# Patient Record
Sex: MALE | Race: BLACK OR AFRICAN AMERICAN | Employment: UNEMPLOYED | URBAN - METROPOLITAN AREA
[De-identification: names, ages, dates, MRNs, and addresses within clinical notes are randomized per-mention and may not be internally consistent; named-entity substitution may affect disease eponyms.]

---

## 2023-04-19 ENCOUNTER — OFFICE VISIT (OUTPATIENT)
Age: 28
End: 2023-04-19
Payer: COMMERCIAL

## 2023-04-19 ENCOUNTER — HOSPITAL ENCOUNTER (OUTPATIENT)
Facility: HOSPITAL | Age: 28
Setting detail: SPECIMEN
Discharge: HOME | End: 2023-04-22
Payer: COMMERCIAL

## 2023-04-19 VITALS
RESPIRATION RATE: 16 BRPM | HEIGHT: 69 IN | OXYGEN SATURATION: 100 % | HEART RATE: 64 BPM | WEIGHT: 133.6 LBS | TEMPERATURE: 98.4 F | DIASTOLIC BLOOD PRESSURE: 75 MMHG | SYSTOLIC BLOOD PRESSURE: 123 MMHG | BODY MASS INDEX: 19.79 KG/M2

## 2023-04-19 DIAGNOSIS — M10.9 ACUTE GOUT OF MULTIPLE SITES, UNSPECIFIED CAUSE: Primary | ICD-10-CM

## 2023-04-19 DIAGNOSIS — Z87.442 HISTORY OF NEPHROLITHIASIS: ICD-10-CM

## 2023-04-19 DIAGNOSIS — M10.9 ACUTE GOUT OF MULTIPLE SITES, UNSPECIFIED CAUSE: ICD-10-CM

## 2023-04-19 DIAGNOSIS — Z87.39 HISTORY OF GOUT: ICD-10-CM

## 2023-04-19 LAB
ALBUMIN SERPL-MCNC: 3.7 G/DL (ref 3.4–5)
ALBUMIN/GLOB SERPL: 1 (ref 0.8–1.7)
ALP SERPL-CCNC: 122 U/L (ref 45–117)
ALT SERPL-CCNC: 19 U/L (ref 16–61)
ANION GAP SERPL CALC-SCNC: 2 MMOL/L (ref 3–18)
AST SERPL-CCNC: 12 U/L (ref 10–38)
BASOPHILS # BLD: 0 K/UL (ref 0–0.1)
BASOPHILS NFR BLD: 0 % (ref 0–2)
BILIRUB SERPL-MCNC: 0.2 MG/DL (ref 0.2–1)
BUN SERPL-MCNC: 24 MG/DL (ref 7–18)
BUN/CREAT SERPL: 20 (ref 12–20)
CALCIUM SERPL-MCNC: 9.5 MG/DL (ref 8.5–10.1)
CHLORIDE SERPL-SCNC: 112 MMOL/L (ref 100–111)
CO2 SERPL-SCNC: 26 MMOL/L (ref 21–32)
CREAT SERPL-MCNC: 1.19 MG/DL (ref 0.6–1.3)
DIFFERENTIAL METHOD BLD: ABNORMAL
EOSINOPHIL # BLD: 0 K/UL (ref 0–0.4)
EOSINOPHIL NFR BLD: 0 % (ref 0–5)
ERYTHROCYTE [DISTWIDTH] IN BLOOD BY AUTOMATED COUNT: 21.1 % (ref 11.6–14.5)
GLOBULIN SER CALC-MCNC: 3.6 G/DL (ref 2–4)
GLUCOSE SERPL-MCNC: 83 MG/DL (ref 74–99)
HCT VFR BLD AUTO: 33.6 % (ref 36–48)
HGB BLD-MCNC: 10.4 G/DL (ref 13–16)
IMM GRANULOCYTES # BLD AUTO: 0 K/UL (ref 0–0.04)
IMM GRANULOCYTES NFR BLD AUTO: 0 % (ref 0–0.5)
LYMPHOCYTES # BLD: 1.9 K/UL (ref 0.9–3.6)
LYMPHOCYTES NFR BLD: 26 % (ref 21–52)
MCH RBC QN AUTO: 30.7 PG (ref 24–34)
MCHC RBC AUTO-ENTMCNC: 31 G/DL (ref 31–37)
MCV RBC AUTO: 99.1 FL (ref 78–100)
MONOCYTES # BLD: 0.8 K/UL (ref 0.05–1.2)
MONOCYTES NFR BLD: 12 % (ref 3–10)
NEUTS SEG # BLD: 4.5 K/UL (ref 1.8–8)
NEUTS SEG NFR BLD: 61 % (ref 40–73)
NRBC # BLD: 0.02 K/UL (ref 0–0.01)
NRBC BLD-RTO: 0.3 PER 100 WBC
PLATELET # BLD AUTO: 391 K/UL (ref 135–420)
PMV BLD AUTO: 11.4 FL (ref 9.2–11.8)
POTASSIUM SERPL-SCNC: 4.3 MMOL/L (ref 3.5–5.5)
PROT SERPL-MCNC: 7.3 G/DL (ref 6.4–8.2)
RBC # BLD AUTO: 3.39 M/UL (ref 4.35–5.65)
SODIUM SERPL-SCNC: 140 MMOL/L (ref 136–145)
URATE SERPL-MCNC: 9.4 MG/DL (ref 2.6–7.2)
WBC # BLD AUTO: 7.3 K/UL (ref 4.6–13.2)

## 2023-04-19 PROCEDURE — 84550 ASSAY OF BLOOD/URIC ACID: CPT

## 2023-04-19 PROCEDURE — 36415 COLL VENOUS BLD VENIPUNCTURE: CPT

## 2023-04-19 PROCEDURE — 80053 COMPREHEN METABOLIC PANEL: CPT

## 2023-04-19 PROCEDURE — 99203 OFFICE O/P NEW LOW 30 MIN: CPT | Performed by: NURSE PRACTITIONER

## 2023-04-19 PROCEDURE — 85025 COMPLETE CBC W/AUTO DIFF WBC: CPT

## 2023-04-19 RX ORDER — PREDNISONE 20 MG/1
20 TABLET ORAL DAILY
Qty: 5 TABLET | Refills: 0 | Status: SHIPPED | OUTPATIENT
Start: 2023-04-19 | End: 2023-04-24

## 2023-04-19 SDOH — ECONOMIC STABILITY: FOOD INSECURITY: WITHIN THE PAST 12 MONTHS, THE FOOD YOU BOUGHT JUST DIDN'T LAST AND YOU DIDN'T HAVE MONEY TO GET MORE.: NEVER TRUE

## 2023-04-19 SDOH — ECONOMIC STABILITY: FOOD INSECURITY: WITHIN THE PAST 12 MONTHS, YOU WORRIED THAT YOUR FOOD WOULD RUN OUT BEFORE YOU GOT MONEY TO BUY MORE.: NEVER TRUE

## 2023-04-19 SDOH — ECONOMIC STABILITY: HOUSING INSECURITY
IN THE LAST 12 MONTHS, WAS THERE A TIME WHEN YOU DID NOT HAVE A STEADY PLACE TO SLEEP OR SLEPT IN A SHELTER (INCLUDING NOW)?: NO

## 2023-04-19 SDOH — ECONOMIC STABILITY: INCOME INSECURITY: HOW HARD IS IT FOR YOU TO PAY FOR THE VERY BASICS LIKE FOOD, HOUSING, MEDICAL CARE, AND HEATING?: SOMEWHAT HARD

## 2023-04-19 ASSESSMENT — PATIENT HEALTH QUESTIONNAIRE - PHQ9
SUM OF ALL RESPONSES TO PHQ QUESTIONS 1-9: 0
1. LITTLE INTEREST OR PLEASURE IN DOING THINGS: 0
SUM OF ALL RESPONSES TO PHQ9 QUESTIONS 1 & 2: 0
2. FEELING DOWN, DEPRESSED OR HOPELESS: 0
SUM OF ALL RESPONSES TO PHQ QUESTIONS 1-9: 0

## 2023-04-19 ASSESSMENT — ENCOUNTER SYMPTOMS: COLOR CHANGE: 0

## 2023-04-19 NOTE — PROGRESS NOTES
1. \"Have you been to the ER, urgent care clinic since your last visit? Hospitalized since your last visit? \" No    2. \"Have you seen or consulted any other health care providers outside of the [address] [place] [address] since your last visit? \" No     3. For patients aged 39-70: Has the patient had a colonoscopy / FIT/ Cologuard?  NA - based on age
ankle with swelling medially and laterally, gait is guarded, DP 2 +   Skin:     General: Skin is warm and dry. Capillary Refill: Capillary refill takes less than 2 seconds. Findings: No erythema. An electronic signature was used to authenticate this note.     --BRITT Mosquera - NP

## 2023-04-26 ENCOUNTER — TELEPHONE (OUTPATIENT)
Age: 28
End: 2023-04-26

## 2023-04-26 NOTE — TELEPHONE ENCOUNTER
Informed patient NP Freedom Louis has not reviewed his lab results yet and I will reach out to him once she does. Patient verbalized a understanding.      Sathya London

## 2023-04-27 DIAGNOSIS — R74.8 ELEVATED ALKALINE PHOSPHATASE LEVEL: ICD-10-CM

## 2023-04-27 DIAGNOSIS — D50.9 IRON DEFICIENCY ANEMIA, UNSPECIFIED IRON DEFICIENCY ANEMIA TYPE: Primary | ICD-10-CM

## 2023-04-27 DIAGNOSIS — M1A.9XX1 TOPHI: ICD-10-CM

## 2023-04-27 DIAGNOSIS — D50.9 IRON DEFICIENCY ANEMIA, UNSPECIFIED IRON DEFICIENCY ANEMIA TYPE: ICD-10-CM

## 2023-04-27 DIAGNOSIS — Z87.442 HISTORY OF NEPHROLITHIASIS: ICD-10-CM

## 2023-04-27 DIAGNOSIS — Z87.39 HISTORY OF GOUT: Primary | ICD-10-CM

## 2023-04-27 RX ORDER — LANOLIN ALCOHOL/MO/W.PET/CERES
325 CREAM (GRAM) TOPICAL 2 TIMES DAILY
Qty: 90 TABLET | Refills: 3 | Status: SHIPPED | OUTPATIENT
Start: 2023-04-27

## 2023-04-27 NOTE — PROGRESS NOTES
Ref to rheum for long term gout and nephrolithiasis in childhood into adulthood.  He is with anemia, will start iron and repeat testing in 3 months or sooner

## 2023-05-08 ENCOUNTER — TELEPHONE (OUTPATIENT)
Age: 28
End: 2023-05-08

## 2023-05-08 NOTE — TELEPHONE ENCOUNTER
Tried reaching out to patient in regards to his lab results. Patient mom state that she will have him return my call once she returns home.      Andrzej Irizarry

## 2023-05-10 ENCOUNTER — TELEPHONE (OUTPATIENT)
Age: 28
End: 2023-05-10

## 2023-05-12 NOTE — TELEPHONE ENCOUNTER
Spoke w patient regarding labs. States he has gout flair up and no lead exposure. Patient verbalized a understanding for labs.      Elisabet Bush

## 2023-12-06 PROBLEM — N17.9 ACUTE KIDNEY INJURY (HCC): Status: ACTIVE | Noted: 2023-12-06

## 2023-12-11 PROBLEM — Q61.5 MEDULLARY SPONGE KIDNEY OF BOTH KIDNEYS: Status: ACTIVE | Noted: 2023-12-11

## 2023-12-11 PROBLEM — Z96.0 S/P URETERAL STENT PLACEMENT: Status: ACTIVE | Noted: 2023-12-11

## 2023-12-11 PROBLEM — D64.9 CHRONIC ANEMIA: Status: ACTIVE | Noted: 2023-12-11

## 2023-12-11 PROBLEM — M10.9 GOUT: Status: ACTIVE | Noted: 2023-12-11

## 2023-12-11 PROBLEM — N17.9 ACUTE KIDNEY INJURY (HCC): Status: RESOLVED | Noted: 2023-12-06 | Resolved: 2023-12-11

## 2023-12-11 PROBLEM — E53.8 B12 DEFICIENCY: Status: ACTIVE | Noted: 2023-12-11

## 2023-12-11 PROBLEM — N13.30 HYDRONEPHROSIS: Status: ACTIVE | Noted: 2023-12-11

## 2023-12-13 PROBLEM — A41.9 SEPTIC SHOCK (HCC): Status: ACTIVE | Noted: 2023-12-13

## 2023-12-13 PROBLEM — R65.21 SEPTIC SHOCK (HCC): Status: ACTIVE | Noted: 2023-12-13

## 2023-12-25 DIAGNOSIS — R93.89 ABNORMAL X-RAY: Primary | ICD-10-CM

## 2023-12-25 DIAGNOSIS — Z87.39 HISTORY OF GOUT: ICD-10-CM

## 2023-12-29 ENCOUNTER — TELEPHONE (OUTPATIENT)
Age: 28
End: 2023-12-29

## 2023-12-29 NOTE — TELEPHONE ENCOUNTER
Patient returning call from nurse in regards to results from blood work.
Tried reaching out to patient, but there was no answer.  Left .     Kirstie Perfect
PROVIDER:[TOKEN:[[id number]]]

## 2023-12-29 NOTE — TELEPHONE ENCOUNTER
----- Message from BRITT Masterson NP sent at 12/25/2023  8:00 PM EST -----  I would like you to be seen by the foot/ankle specialist for abnormality to the xray, sent in referral to Dr. Rocío Naidu.

## 2024-01-04 ENCOUNTER — TELEPHONE (OUTPATIENT)
Age: 29
End: 2024-01-04

## 2024-01-04 NOTE — TELEPHONE ENCOUNTER
Reached out to patient again to go over the xray results, but his brother answered the call. Patient brother will informed him to give me a call.

## 2024-01-04 NOTE — TELEPHONE ENCOUNTER
----- Message from BRITT El NP sent at 12/25/2023  8:00 PM EST -----  I would like you to be seen by the foot/ankle specialist for abnormality to the xray, sent in referral to Dr. Yost.

## 2024-01-22 ENCOUNTER — TELEPHONE (OUTPATIENT)
Age: 29
End: 2024-01-22

## 2024-02-02 ENCOUNTER — OFFICE VISIT (OUTPATIENT)
Age: 29
End: 2024-02-02
Payer: COMMERCIAL

## 2024-02-02 VITALS
HEIGHT: 67 IN | OXYGEN SATURATION: 98 % | DIASTOLIC BLOOD PRESSURE: 76 MMHG | BODY MASS INDEX: 20.56 KG/M2 | HEART RATE: 60 BPM | SYSTOLIC BLOOD PRESSURE: 127 MMHG | WEIGHT: 131 LBS | RESPIRATION RATE: 16 BRPM

## 2024-02-02 DIAGNOSIS — M10.9 ACUTE GOUT OF RIGHT KNEE, UNSPECIFIED CAUSE: Primary | Chronic | ICD-10-CM

## 2024-02-02 PROBLEM — A41.9 SEPTIC SHOCK (HCC): Status: RESOLVED | Noted: 2023-12-13 | Resolved: 2024-02-02

## 2024-02-02 PROBLEM — R65.21 SEPTIC SHOCK (HCC): Status: RESOLVED | Noted: 2023-12-13 | Resolved: 2024-02-02

## 2024-02-02 PROBLEM — Z96.0 S/P URETERAL STENT PLACEMENT: Status: RESOLVED | Noted: 2023-12-11 | Resolved: 2024-02-02

## 2024-02-02 PROCEDURE — 99213 OFFICE O/P EST LOW 20 MIN: CPT | Performed by: NURSE PRACTITIONER

## 2024-02-02 ASSESSMENT — PATIENT HEALTH QUESTIONNAIRE - PHQ9
2. FEELING DOWN, DEPRESSED OR HOPELESS: 0
1. LITTLE INTEREST OR PLEASURE IN DOING THINGS: 0
SUM OF ALL RESPONSES TO PHQ QUESTIONS 1-9: 0
SUM OF ALL RESPONSES TO PHQ9 QUESTIONS 1 & 2: 0

## 2024-02-02 NOTE — PROGRESS NOTES
Levi Levy (:  1995) is a 28 y.o. male, here for evaluation of the following medical concerns:  Chief Complaint   Patient presents with    Follow-up     Pt c/o Pain in Right knee         ASSESSMENT/PLAN:    1. Acute gout of right knee, unspecified cause   Acute on chronic gout flare- he has been out of medication x 1 week will refill, no redness, swelling, heat and pain is mild     Return if symptoms worsen or fail to improve.        HPI  Right knee pain x 2 days states gout flare, no swelling, redness, pain /10. No otc medications. Ran out of allopurinol x 1 week.     2023 gfr >60 and Creatinine 1.35,  electrolytes good, he is with kidney or urology specialist- medullary sponge kidney bilateral, has urethral stent in place  He reports under Urology care    Dragging left leg- Abnormal xray left ankle and refer to ortho 2023 has not seen    Review of Systems   Constitutional:  Negative for diaphoresis and fatigue.   Respiratory:  Negative for cough, chest tightness and shortness of breath.    Cardiovascular:  Negative for chest pain, palpitations and leg swelling.   Gastrointestinal:  Negative for abdominal pain, nausea and vomiting.   Neurological:  Negative for dizziness, weakness and headaches.       Prior to Visit Medications    Medication Sig Taking? Authorizing Provider   allopurinol (ZYLOPRIM) 100 MG tablet Take 1 tablet by mouth daily Yes Jessica Kellogg APRN - NP   vitamin B-12 1000 MCG tablet Take 1 tablet by mouth daily Yes Ollie Keys MD        Social History     Tobacco Use    Smoking status: Some Days     Current packs/day: 0.50     Types: Cigarettes     Passive exposure: Never    Smokeless tobacco: Never   Substance Use Topics    Alcohol use: Never        /76 (Site: Left Upper Arm, Position: Sitting, Cuff Size: Medium Adult)   Pulse 60   Resp 16   Ht 1.702 m (5' 7\")   Wt 59.4 kg (131 lb)   SpO2 98%   BMI 20.52 kg/m²   Estimated body mass index is 20.52

## 2024-02-04 RX ORDER — ALLOPURINOL 100 MG/1
100 TABLET ORAL DAILY
Qty: 90 TABLET | Refills: 1 | Status: SHIPPED | OUTPATIENT
Start: 2024-02-04

## 2024-05-28 ENCOUNTER — HOSPITAL ENCOUNTER (OUTPATIENT)
Facility: HOSPITAL | Age: 29
Setting detail: SPECIMEN
Discharge: HOME | End: 2024-05-31
Payer: COMMERCIAL

## 2024-05-28 DIAGNOSIS — M1A.00X0 IDIOPATHIC CHRONIC GOUT WITHOUT TOPHUS, UNSPECIFIED SITE: ICD-10-CM

## 2024-05-28 DIAGNOSIS — R31.9 HEMATURIA, UNSPECIFIED TYPE: ICD-10-CM

## 2024-05-28 DIAGNOSIS — Z87.442 HISTORY OF NEPHROLITHIASIS: ICD-10-CM

## 2024-05-28 DIAGNOSIS — M54.9 COSTOVERTEBRAL ANGLE TENDERNESS: ICD-10-CM

## 2024-05-28 DIAGNOSIS — R00.0 TACHYCARDIA: ICD-10-CM

## 2024-05-28 LAB
ALBUMIN SERPL-MCNC: 3.5 G/DL (ref 3.4–5)
ALBUMIN/GLOB SERPL: 0.8 (ref 0.8–1.7)
ALP SERPL-CCNC: 115 U/L (ref 45–117)
ALT SERPL-CCNC: 22 U/L (ref 16–61)
ANION GAP SERPL CALC-SCNC: 9 MMOL/L (ref 3–18)
APPEARANCE UR: ABNORMAL
AST SERPL-CCNC: 16 U/L (ref 10–38)
BACTERIA URNS QL MICRO: ABNORMAL /HPF
BASOPHILS # BLD: 0 K/UL (ref 0–0.1)
BASOPHILS NFR BLD: 0 % (ref 0–2)
BILIRUB SERPL-MCNC: 0.6 MG/DL (ref 0.2–1)
BILIRUB UR QL: NEGATIVE
BUN SERPL-MCNC: 23 MG/DL (ref 7–18)
BUN/CREAT SERPL: 15 (ref 12–20)
CALCIUM SERPL-MCNC: 9.7 MG/DL (ref 8.5–10.1)
CHLORIDE SERPL-SCNC: 103 MMOL/L (ref 100–111)
CO2 SERPL-SCNC: 23 MMOL/L (ref 21–32)
COLOR UR: YELLOW
CREAT SERPL-MCNC: 1.51 MG/DL (ref 0.6–1.3)
DIFFERENTIAL METHOD BLD: ABNORMAL
EOSINOPHIL # BLD: 0 K/UL (ref 0–0.4)
EOSINOPHIL NFR BLD: 0 % (ref 0–5)
EPITH CASTS URNS QL MICRO: ABNORMAL /LPF (ref 0–5)
ERYTHROCYTE [DISTWIDTH] IN BLOOD BY AUTOMATED COUNT: 20 % (ref 11.6–14.5)
EST. AVERAGE GLUCOSE BLD GHB EST-MCNC: 100 MG/DL
GLOBULIN SER CALC-MCNC: 4.3 G/DL (ref 2–4)
GLUCOSE SERPL-MCNC: 91 MG/DL (ref 74–99)
GLUCOSE UR STRIP.AUTO-MCNC: NEGATIVE MG/DL
HBA1C MFR BLD: 5.1 % (ref 4.2–5.6)
HCT VFR BLD AUTO: 30.7 % (ref 36–48)
HGB BLD-MCNC: 9.8 G/DL (ref 13–16)
HGB UR QL STRIP: ABNORMAL
IMM GRANULOCYTES # BLD AUTO: 0.1 K/UL (ref 0–0.04)
IMM GRANULOCYTES NFR BLD AUTO: 1 % (ref 0–0.5)
KETONES UR QL STRIP.AUTO: NEGATIVE MG/DL
LEUKOCYTE ESTERASE UR QL STRIP.AUTO: ABNORMAL
LYMPHOCYTES # BLD: 1.8 K/UL (ref 0.9–3.6)
LYMPHOCYTES NFR BLD: 19 % (ref 21–52)
MCH RBC QN AUTO: 31 PG (ref 24–34)
MCHC RBC AUTO-ENTMCNC: 31.9 G/DL (ref 31–37)
MCV RBC AUTO: 97.2 FL (ref 78–100)
MONOCYTES # BLD: 0.9 K/UL (ref 0.05–1.2)
MONOCYTES NFR BLD: 9 % (ref 3–10)
NEUTS SEG # BLD: 6.9 K/UL (ref 1.8–8)
NEUTS SEG NFR BLD: 71 % (ref 40–73)
NITRITE UR QL STRIP.AUTO: NEGATIVE
NRBC # BLD: 0.02 K/UL (ref 0–0.01)
NRBC BLD-RTO: 0.2 PER 100 WBC
PH UR STRIP: 6.5 (ref 5–8)
PLATELET # BLD AUTO: 645 K/UL (ref 135–420)
PMV BLD AUTO: 11.3 FL (ref 9.2–11.8)
POTASSIUM SERPL-SCNC: 4.9 MMOL/L (ref 3.5–5.5)
PROT SERPL-MCNC: 7.8 G/DL (ref 6.4–8.2)
PROT UR STRIP-MCNC: 30 MG/DL
RBC # BLD AUTO: 3.16 M/UL (ref 4.35–5.65)
RBC #/AREA URNS HPF: ABNORMAL /HPF (ref 0–5)
SODIUM SERPL-SCNC: 135 MMOL/L (ref 136–145)
SP GR UR REFRACTOMETRY: 1.01 (ref 1–1.03)
TSH SERPL DL<=0.05 MIU/L-ACNC: 0.1 UIU/ML (ref 0.36–3.74)
URATE CRY URNS QL MICRO: ABNORMAL
UROBILINOGEN UR QL STRIP.AUTO: 0.2 EU/DL (ref 0.2–1)
WBC # BLD AUTO: 9.7 K/UL (ref 4.6–13.2)
WBC URNS QL MICRO: ABNORMAL /HPF (ref 0–4)

## 2024-05-28 PROCEDURE — 85025 COMPLETE CBC W/AUTO DIFF WBC: CPT

## 2024-05-28 PROCEDURE — 80053 COMPREHEN METABOLIC PANEL: CPT

## 2024-05-28 PROCEDURE — 36415 COLL VENOUS BLD VENIPUNCTURE: CPT

## 2024-05-28 PROCEDURE — 87491 CHLMYD TRACH DNA AMP PROBE: CPT

## 2024-05-28 PROCEDURE — 81001 URINALYSIS AUTO W/SCOPE: CPT

## 2024-05-28 PROCEDURE — 87661 TRICHOMONAS VAGINALIS AMPLIF: CPT

## 2024-05-28 PROCEDURE — 83036 HEMOGLOBIN GLYCOSYLATED A1C: CPT

## 2024-05-28 PROCEDURE — 84443 ASSAY THYROID STIM HORMONE: CPT

## 2024-05-28 PROCEDURE — 87086 URINE CULTURE/COLONY COUNT: CPT

## 2024-05-28 PROCEDURE — 87591 N.GONORRHOEAE DNA AMP PROB: CPT

## 2024-05-29 LAB
BACTERIA SPEC CULT: NORMAL
SERVICE CMNT-IMP: NORMAL

## 2024-05-30 LAB
C TRACH RRNA SPEC QL NAA+PROBE: NEGATIVE
N GONORRHOEA RRNA SPEC QL NAA+PROBE: NEGATIVE
SPECIMEN SOURCE: NORMAL
T VAGINALIS RRNA SPEC QL NAA+PROBE: NEGATIVE

## 2024-06-07 ENCOUNTER — HOSPITAL ENCOUNTER (OUTPATIENT)
Facility: HOSPITAL | Age: 29
Setting detail: SPECIMEN
End: 2024-06-07
Payer: COMMERCIAL

## 2024-06-07 DIAGNOSIS — R79.89 LOW TSH LEVEL: ICD-10-CM

## 2024-06-07 LAB
T4 FREE SERPL-MCNC: 1 NG/DL (ref 0.7–1.5)
TSH SERPL DL<=0.05 MIU/L-ACNC: 0.27 UIU/ML (ref 0.36–3.74)

## 2024-06-07 PROCEDURE — 36415 COLL VENOUS BLD VENIPUNCTURE: CPT

## 2024-06-07 PROCEDURE — 86800 THYROGLOBULIN ANTIBODY: CPT

## 2024-06-07 PROCEDURE — 84439 ASSAY OF FREE THYROXINE: CPT

## 2024-06-07 PROCEDURE — 84443 ASSAY THYROID STIM HORMONE: CPT

## 2024-06-07 PROCEDURE — 86376 MICROSOMAL ANTIBODY EACH: CPT

## 2024-06-07 PROCEDURE — 84480 ASSAY TRIIODOTHYRONINE (T3): CPT

## 2024-06-11 LAB
T3 SERPL-MCNC: 92 NG/DL (ref 71–180)
THYROGLOB AB SERPL-ACNC: <1 IU/ML (ref 0–0.9)
THYROPEROXIDASE AB SERPL-ACNC: <9 IU/ML (ref 0–34)

## 2024-06-12 DIAGNOSIS — E05.90 HYPERTHYROIDISM: Primary | ICD-10-CM

## 2024-07-18 RX ORDER — PREDNISONE 20 MG/1
40 TABLET ORAL DAILY
Qty: 10 TABLET | Refills: 0 | Status: SHIPPED | OUTPATIENT
Start: 2024-07-18 | End: 2024-07-23

## 2024-07-19 ENCOUNTER — TELEMEDICINE (OUTPATIENT)
Facility: CLINIC | Age: 29
End: 2024-07-19
Payer: COMMERCIAL

## 2024-07-19 DIAGNOSIS — Q61.5 MEDULLARY SPONGE KIDNEY OF BOTH KIDNEYS: ICD-10-CM

## 2024-07-19 DIAGNOSIS — M10.062 ACUTE IDIOPATHIC GOUT OF LEFT KNEE: ICD-10-CM

## 2024-07-19 DIAGNOSIS — M10.39 ACUTE GOUT DUE TO RENAL IMPAIRMENT INVOLVING MULTIPLE SITES: Primary | ICD-10-CM

## 2024-07-19 PROCEDURE — 99213 OFFICE O/P EST LOW 20 MIN: CPT | Performed by: NURSE PRACTITIONER

## 2024-07-19 RX ORDER — ALLOPURINOL 100 MG/1
100 TABLET ORAL DAILY
Qty: 90 TABLET | Refills: 1 | Status: SHIPPED | OUTPATIENT
Start: 2024-07-19

## 2024-07-19 ASSESSMENT — ENCOUNTER SYMPTOMS
SHORTNESS OF BREATH: 0
VOMITING: 0
NAUSEA: 0
CHEST TIGHTNESS: 0
COUGH: 0
ABDOMINAL PAIN: 0

## 2024-07-19 NOTE — PROGRESS NOTES
Levi Levy, was evaluated through a synchronous (real-time) audio-video encounter. The patient (or guardian if applicable) is aware that this is a billable service, which includes applicable co-pays. This Virtual Visit was conducted with patient's (and/or legal guardian's) consent. Patient identification was verified, and a caregiver was present when appropriate.   The patient was located at Home: [address]  [facility] [address]  Provider was located at Facility (Appt Dept): [address]  [place] [address]  Confirm you are appropriately licensed, registered, or certified to deliver care in the state where the patient is located as indicated above. If you are not or unsure, please re-schedule the visit: Yes, I confirm.     Levi Levy (:  1995) is a Established patient, presenting virtually for evaluation of the following:    Assessment & Plan   Below is the assessment and plan developed based on review of pertinent history, physical exam, labs, studies, and medications.  1. Acute gout due to renal impairment involving multiple sites  -     predniSONE (DELTASONE) 20 MG tablet; Take 2 tablets by mouth daily for 5 days, Disp-10 tablet, R-0Normal (Patient not taking: Reported on 2024)  2. Medullary sponge kidney of both kidneys  -     External Referral To Nephrology  3. Acute idiopathic gout of left knee  -     External Referral To Rheumatology    No follow-ups on file.       Subjective   HPI  Here today for gout flare- left knee, just got back into town, onset 2 days, pain, swelling, redness, he is laying in bed 8/10 pain today  Denies eating red meats, sodas, salty foods, unsure why triggering    He needs to be seen by rheumatology- resent referral  Need repeat thyroid studies- ref to endo  Needs to see nephrology elevated creatinine, anemia, thrombocytopenia-- may need heme/onc(blood in urine) hx of medullary sponge kidney- ref nephro  -- Ct not completed-

## 2024-08-02 ENCOUNTER — TELEMEDICINE (OUTPATIENT)
Facility: CLINIC | Age: 29
End: 2024-08-02

## 2024-08-02 ENCOUNTER — TELEMEDICINE (OUTPATIENT)
Facility: CLINIC | Age: 29
End: 2024-08-02
Payer: COMMERCIAL

## 2024-08-02 DIAGNOSIS — M10.00 IDIOPATHIC GOUT, UNSPECIFIED CHRONICITY, UNSPECIFIED SITE: ICD-10-CM

## 2024-08-02 DIAGNOSIS — E53.8 B12 DEFICIENCY: ICD-10-CM

## 2024-08-02 DIAGNOSIS — D64.9 CHRONIC ANEMIA: ICD-10-CM

## 2024-08-02 DIAGNOSIS — Q61.5 MEDULLARY SPONGE KIDNEY OF BOTH KIDNEYS: Primary | ICD-10-CM

## 2024-08-02 DIAGNOSIS — N13.30 HYDRONEPHROSIS, UNSPECIFIED HYDRONEPHROSIS TYPE: ICD-10-CM

## 2024-08-02 PROCEDURE — 99214 OFFICE O/P EST MOD 30 MIN: CPT | Performed by: NURSE PRACTITIONER

## 2024-08-02 RX ORDER — PREDNISONE 20 MG/1
40 TABLET ORAL DAILY
Qty: 14 TABLET | Refills: 0 | Status: SHIPPED | OUTPATIENT
Start: 2024-08-02 | End: 2024-08-09

## 2024-08-02 ASSESSMENT — ENCOUNTER SYMPTOMS
ABDOMINAL PAIN: 0
CHEST TIGHTNESS: 0
COUGH: 0
VOMITING: 0
SHORTNESS OF BREATH: 0
NAUSEA: 0

## 2024-08-02 NOTE — PROGRESS NOTES
Levi Levy, was evaluated through a synchronous (real-time) audio-video encounter. The patient (or guardian if applicable) is aware that this is a billable service, which includes applicable co-pays. This Virtual Visit was conducted with patient's (and/or legal guardian's) consent. Patient identification was verified, and a caregiver was present when appropriate.   The patient was located at Home: [address]  [facility] [address]  Provider was located at Facility (Appt Dept): [address]  [place] [address]  Confirm you are appropriately licensed, registered, or certified to deliver care in the state where the patient is located as indicated above. If you are not or unsure, please re-schedule the visit: Yes, I confirm.     Levi Levy (:  1995) is a Established patient, presenting virtually for evaluation of the following:    Assessment & Plan   Below is the assessment and plan developed based on review of pertinent history, physical exam, labs, studies, and medications.  1. Medullary sponge kidney of both kidneys  2. Hydronephrosis, unspecified hydronephrosis type  3. Idiopathic gout, unspecified chronicity, unspecified site- recurrent thought 2/2 kidney history  4. B12 deficiency  5. Chronic anemia- not on supplementation    Return in about 1 week (around 2024) for gout.       Subjective   HPI  He is here today for acute on chronic gout flare to left elbow. He is taking the Allopurinol. He is with recurrent somewhat resistant flare ups, does not seem to be responding to Allopurinol maintenance therapy however medication compliance is on and off. I am concerned that this is 2/2 kidney issues chronic, he has not heard from or contacted Dr. Koch.     Ref to rheum Dr. Christensen not covered by insurance  Sent new referral to [facility] urgent    He has allopurinol report    He never completed the CT abd/pelvis- states when he calls he gets the run around- I have asked Lourdes

## 2024-08-02 NOTE — PROGRESS NOTES
No show for appt, rheumatology appt for [facility] urgent need placed, difficult to find covering provider due to insurance.

## 2024-09-24 ENCOUNTER — TELEMEDICINE (OUTPATIENT)
Facility: CLINIC | Age: 29
End: 2024-09-24
Payer: COMMERCIAL

## 2024-09-24 DIAGNOSIS — M10.00 IDIOPATHIC GOUT, UNSPECIFIED CHRONICITY, UNSPECIFIED SITE: Primary | ICD-10-CM

## 2024-09-24 PROBLEM — I21.4 NSTEMI (NON-ST ELEVATED MYOCARDIAL INFARCTION) (HCC): Status: ACTIVE | Noted: 2024-09-24

## 2024-09-24 PROCEDURE — 99213 OFFICE O/P EST LOW 20 MIN: CPT | Performed by: NURSE PRACTITIONER

## 2024-09-24 RX ORDER — INDOMETHACIN 25 MG/1
25 CAPSULE ORAL 3 TIMES DAILY
Qty: 60 CAPSULE | Refills: 0 | Status: SHIPPED | OUTPATIENT
Start: 2024-09-24 | End: 2025-09-24

## 2024-09-24 RX ORDER — COLCHICINE 0.6 MG/1
TABLET ORAL
Qty: 90 TABLET | Refills: 1 | Status: SHIPPED | OUTPATIENT
Start: 2024-09-24 | End: 2024-09-25 | Stop reason: ALTCHOICE

## 2024-09-24 RX ORDER — COLCHICINE 0.6 MG/1
0.6 TABLET ORAL DAILY
Qty: 90 TABLET | Refills: 1 | Status: SHIPPED | OUTPATIENT
Start: 2024-09-24 | End: 2024-09-24

## 2024-09-25 RX ORDER — COLCHICINE 0.6 MG/1
CAPSULE ORAL
Qty: 90 CAPSULE | Refills: 1 | Status: SHIPPED | OUTPATIENT
Start: 2024-09-25

## 2024-10-11 NOTE — TELEPHONE ENCOUNTER
Last appointment 9/24/2024  Next appointment not scheduled     Last written 8/20/2024 #14 0 refills

## 2024-10-14 RX ORDER — PREDNISONE 20 MG/1
40 TABLET ORAL DAILY
Qty: 14 TABLET | Refills: 0 | OUTPATIENT
Start: 2024-10-14 | End: 2024-10-21

## 2024-11-26 ENCOUNTER — TELEMEDICINE (OUTPATIENT)
Facility: CLINIC | Age: 29
End: 2024-11-26
Payer: COMMERCIAL

## 2024-11-26 DIAGNOSIS — M25.50 ARTHRALGIA, UNSPECIFIED JOINT: Primary | ICD-10-CM

## 2024-11-26 DIAGNOSIS — M10.00 IDIOPATHIC GOUT, UNSPECIFIED CHRONICITY, UNSPECIFIED SITE: ICD-10-CM

## 2024-11-26 PROCEDURE — 99213 OFFICE O/P EST LOW 20 MIN: CPT | Performed by: NURSE PRACTITIONER

## 2024-11-26 RX ORDER — INDOMETHACIN 25 MG/1
25 CAPSULE ORAL 3 TIMES DAILY
Qty: 60 CAPSULE | Refills: 1 | Status: SHIPPED | OUTPATIENT
Start: 2024-11-26 | End: 2025-11-26

## 2024-11-26 RX ORDER — PREDNISONE 20 MG/1
20 TABLET ORAL DAILY
Qty: 7 TABLET | Refills: 0 | Status: SHIPPED | OUTPATIENT
Start: 2024-11-26 | End: 2024-12-03

## 2024-11-26 ASSESSMENT — PATIENT HEALTH QUESTIONNAIRE - PHQ9
SUM OF ALL RESPONSES TO PHQ QUESTIONS 1-9: 0
2. FEELING DOWN, DEPRESSED OR HOPELESS: NOT AT ALL
SUM OF ALL RESPONSES TO PHQ9 QUESTIONS 1 & 2: 0
SUM OF ALL RESPONSES TO PHQ QUESTIONS 1-9: 0
SUM OF ALL RESPONSES TO PHQ QUESTIONS 1-9: 0
1. LITTLE INTEREST OR PLEASURE IN DOING THINGS: NOT AT ALL
SUM OF ALL RESPONSES TO PHQ QUESTIONS 1-9: 0

## 2024-11-26 NOTE — PROGRESS NOTES
Levi Levy, was evaluated through a synchronous (real-time) audio-video encounter. The patient (or guardian if applicable) is aware that this is a billable service, which includes applicable co-pays. This Virtual Visit was conducted with patient's (and/or legal guardian's) consent. Patient identification was verified, and a caregiver was present when appropriate.   The patient was located at Home: [address]  [facility] [address]  Provider was located at Facility (Appt Dept): [address]  [place] [address]  Confirm you are appropriately licensed, registered, or certified to deliver care in the state where the patient is located as indicated above. If you are not or unsure, please re-schedule the visit: Yes, I confirm.     Levi Levy (:  1995) is a Established patient, presenting virtually for evaluation of the following:      Below is the assessment and plan developed based on review of pertinent history, physical exam, labs, studies, and medications.     Assessment & Plan  Arthralgia, unspecified joint   Chronic, not at goal (unstable), unable to afford the colchine right now will do prednisone short burst hold indomethacin while taking then resume, continue allopurinol for now    Orders:    predniSONE (DELTASONE) 20 MG tablet; Take 1 tablet by mouth daily for 7 days    indomethacin (INDOCIN) 25 MG capsule; Take 1 capsule by mouth 3 times daily    Idiopathic gout, unspecified chronicity, unspecified site   Chronic, not at goal (unstable), continue current treatment plan    Orders:    predniSONE (DELTASONE) 20 MG tablet; Take 1 tablet by mouth daily for 7 days    indomethacin (INDOCIN) 25 MG capsule; Take 1 capsule by mouth 3 times daily      No follow-ups on file.       Subjective   HPI  Generalized pain to bilateral shoulders and elbows, right elbow improving, b/l wrist, right wrist improving, knees no concerns, ankles with soreness. He is now under rheumatology

## 2024-11-26 NOTE — ASSESSMENT & PLAN NOTE
Chronic, not at goal (unstable), continue current treatment plan    Orders:    predniSONE (DELTASONE) 20 MG tablet; Take 1 tablet by mouth daily for 7 days    indomethacin (INDOCIN) 25 MG capsule; Take 1 capsule by mouth 3 times daily

## 2024-12-27 ENCOUNTER — TELEMEDICINE (OUTPATIENT)
Facility: CLINIC | Age: 29
End: 2024-12-27
Payer: COMMERCIAL

## 2024-12-27 DIAGNOSIS — M10.9 EXACERBATION OF GOUT: Primary | ICD-10-CM

## 2024-12-27 PROCEDURE — 99213 OFFICE O/P EST LOW 20 MIN: CPT | Performed by: NURSE PRACTITIONER

## 2024-12-27 RX ORDER — INDOMETHACIN 25 MG/1
25 CAPSULE ORAL 3 TIMES DAILY
Qty: 60 CAPSULE | Refills: 1 | Status: SHIPPED | OUTPATIENT
Start: 2024-12-27 | End: 2025-12-27

## 2024-12-27 RX ORDER — COLCHICINE 0.6 MG/1
CAPSULE ORAL
Qty: 90 CAPSULE | Refills: 1 | Status: CANCELLED | OUTPATIENT
Start: 2024-12-27

## 2024-12-27 RX ORDER — PREDNISONE 20 MG/1
20 TABLET ORAL DAILY
COMMUNITY
End: 2024-12-27 | Stop reason: DRUGHIGH

## 2024-12-27 RX ORDER — ALLOPURINOL 100 MG/1
100 TABLET ORAL DAILY
Qty: 90 TABLET | Refills: 1 | Status: SHIPPED | OUTPATIENT
Start: 2024-12-27

## 2024-12-27 RX ORDER — PREDNISONE 20 MG/1
20 TABLET ORAL DAILY
Status: CANCELLED | OUTPATIENT
Start: 2024-12-27

## 2024-12-27 RX ORDER — PREDNISONE 10 MG/1
TABLET ORAL
Qty: 1 EACH | Refills: 0 | Status: SHIPPED | OUTPATIENT
Start: 2024-12-27

## 2024-12-27 NOTE — PROGRESS NOTES
Levi Levy, was evaluated through a synchronous (real-time) audio-video encounter. The patient (or guardian if applicable) is aware that this is a billable service, which includes applicable co-pays. This Virtual Visit was conducted with patient's (and/or legal guardian's) consent. Patient identification was verified, and a caregiver was present when appropriate.   The patient was located at Home: [address]  [facility] [address]  Provider was located at Facility (Appt Dept): [address]  [place] [address]  Confirm you are appropriately licensed, registered, or certified to deliver care in the state where the patient is located as indicated above. If you are not or unsure, please re-schedule the visit: Yes, I confirm.     Levi Levy (:  1995) is a Established patient, presenting virtually for evaluation of the following:      Below is the assessment and plan developed based on review of pertinent history, physical exam, labs, studies, and medications.     Assessment & Plan  Exacerbation of gout   Chronic, not at goal (unstable), exacerbation of chronic     Orders:    indomethacin (INDOCIN) 25 MG capsule; Take 1 capsule by mouth 3 times daily    predniSONE 10 MG (21) TBPK; Take as directed      No follow-ups on file.       Subjective   HPI  Right knee, left hand- pain 7/10, swollen.   Has been taking allopurinol  Accidentally throw away Indomethacin      Review of Systems       Objective   Patient-Reported Vitals  No data recorded     Physical Exam             --BRITT El - NP

## 2025-02-28 ENCOUNTER — TELEMEDICINE (OUTPATIENT)
Facility: CLINIC | Age: 30
End: 2025-02-28

## 2025-02-28 ENCOUNTER — TELEPHONE (OUTPATIENT)
Facility: CLINIC | Age: 30
End: 2025-02-28

## 2025-02-28 DIAGNOSIS — M10.9 EXACERBATION OF GOUT: ICD-10-CM

## 2025-02-28 DIAGNOSIS — Z00.00 ANNUAL PHYSICAL EXAM: Primary | ICD-10-CM

## 2025-02-28 RX ORDER — PREDNISONE 10 MG/1
TABLET ORAL
Qty: 1 EACH | Refills: 0 | Status: SHIPPED | OUTPATIENT
Start: 2025-02-28

## 2025-02-28 RX ORDER — INDOMETHACIN 25 MG/1
25 CAPSULE ORAL 3 TIMES DAILY
Qty: 60 CAPSULE | Refills: 1 | Status: SHIPPED | OUTPATIENT
Start: 2025-02-28 | End: 2026-02-28

## 2025-02-28 SDOH — ECONOMIC STABILITY: FOOD INSECURITY: WITHIN THE PAST 12 MONTHS, THE FOOD YOU BOUGHT JUST DIDN'T LAST AND YOU DIDN'T HAVE MONEY TO GET MORE.: NEVER TRUE

## 2025-02-28 SDOH — ECONOMIC STABILITY: FOOD INSECURITY: WITHIN THE PAST 12 MONTHS, YOU WORRIED THAT YOUR FOOD WOULD RUN OUT BEFORE YOU GOT MONEY TO BUY MORE.: NEVER TRUE

## 2025-02-28 ASSESSMENT — PATIENT HEALTH QUESTIONNAIRE - PHQ9
SUM OF ALL RESPONSES TO PHQ9 QUESTIONS 1 & 2: 0
2. FEELING DOWN, DEPRESSED OR HOPELESS: NOT AT ALL
SUM OF ALL RESPONSES TO PHQ QUESTIONS 1-9: 0
1. LITTLE INTEREST OR PLEASURE IN DOING THINGS: NOT AT ALL

## 2025-02-28 NOTE — TELEPHONE ENCOUNTER
Spoke with patient in regards to referral informed he needs to contact [facility] to reynaudle an appt to have treatment taken over after his missed appt on 12/9/24.

## 2025-02-28 NOTE — PROGRESS NOTES
Levi Levy, was evaluated through a synchronous (real-time) audio-video encounter. The patient (or guardian if applicable) is aware that this is a billable service, which includes applicable co-pays. This Virtual Visit was conducted with patient's (and/or legal guardian's) consent. Patient identification was verified, and a caregiver was present when appropriate.   The patient was located at Home: [address]  [facility] [address]  Provider was located at Facility (Appt Dept): [address]  [place] [address]  Confirm you are appropriately licensed, registered, or certified to deliver care in the state where the patient is located as indicated above. If you are not or unsure, please re-schedule the visit: Yes, I confirm.     Levi Levy (:  1995) is a Established patient, presenting virtually for evaluation of the following:      Below is the assessment and plan developed based on review of pertinent history, physical exam, labs, studies, and medications.     Assessment & Plan  Exacerbation of gout   Acute condition, recurrent, Supportive care with appropriate antipyretics and fluids.  Refilled medications    Orders:    indomethacin (INDOCIN) 25 MG capsule; Take 1 capsule by mouth 3 times daily    predniSONE 10 MG (21) TBPK; Take as directed    Annual physical exam    Overdue, labs overdue    Orders:    CBC with Auto Differential; Future    Comprehensive Metabolic Panel; Future    Hemoglobin A1C; Future    Lipid Panel; Future    TSH; Future    Urinalysis with Microscopic; Future      Return in about 3 weeks (around 3/21/2025) for [id number], ANNUAL PHYSICAL, LABREVIEW.       Subjective   HPI  Gout flare- states noted left index finger, right middle finger, right ankle starting to hurt-     Lab Results   Component Value Date     (L) 2024    K 4.9 2024     2024    CO2 23 2024    BUN 23 (H) 2024    CREATININE 1.51 (H)

## 2025-03-27 NOTE — TELEPHONE ENCOUNTER
Last Visit: 2/28/2025   Next Appointment: 3/28/2025  Previous Refill Encounter:        Disp-90 tablet, R-1 Start: 12/27/2024

## 2025-03-28 ENCOUNTER — HOSPITAL ENCOUNTER (OUTPATIENT)
Facility: HOSPITAL | Age: 30
Setting detail: SPECIMEN
Discharge: HOME | End: 2025-03-31
Payer: COMMERCIAL

## 2025-03-28 ENCOUNTER — OFFICE VISIT (OUTPATIENT)
Facility: CLINIC | Age: 30
End: 2025-03-28

## 2025-03-28 VITALS
HEIGHT: 67 IN | SYSTOLIC BLOOD PRESSURE: 125 MMHG | OXYGEN SATURATION: 99 % | BODY MASS INDEX: 19.78 KG/M2 | TEMPERATURE: 97.4 F | DIASTOLIC BLOOD PRESSURE: 78 MMHG | HEART RATE: 57 BPM | WEIGHT: 126 LBS

## 2025-03-28 DIAGNOSIS — M25.50 ARTHRALGIA, UNSPECIFIED JOINT: ICD-10-CM

## 2025-03-28 DIAGNOSIS — M1A.00X0 IDIOPATHIC CHRONIC GOUT WITHOUT TOPHUS, UNSPECIFIED SITE: ICD-10-CM

## 2025-03-28 DIAGNOSIS — Z86.39 HISTORY OF HYPERTHYROIDISM: ICD-10-CM

## 2025-03-28 DIAGNOSIS — D64.9 CHRONIC ANEMIA: ICD-10-CM

## 2025-03-28 DIAGNOSIS — E53.8 B12 DEFICIENCY: ICD-10-CM

## 2025-03-28 DIAGNOSIS — R00.2 HEART PALPITATIONS: ICD-10-CM

## 2025-03-28 DIAGNOSIS — D64.9 CHRONIC ANEMIA: Primary | ICD-10-CM

## 2025-03-28 DIAGNOSIS — M10.9 EXACERBATION OF GOUT: ICD-10-CM

## 2025-03-28 DIAGNOSIS — Z87.442 HISTORY OF NEPHROLITHIASIS: ICD-10-CM

## 2025-03-28 DIAGNOSIS — Q61.5 MEDULLARY SPONGE KIDNEY OF BOTH KIDNEYS: ICD-10-CM

## 2025-03-28 DIAGNOSIS — L02.512 ABSCESS OF LEFT MIDDLE FINGER: ICD-10-CM

## 2025-03-28 DIAGNOSIS — I49.9 IRREGULAR HEART RATE: ICD-10-CM

## 2025-03-28 DIAGNOSIS — M10.39 ACUTE GOUT DUE TO RENAL IMPAIRMENT INVOLVING MULTIPLE SITES: ICD-10-CM

## 2025-03-28 LAB
25(OH)D3 SERPL-MCNC: 15 NG/ML (ref 30–100)
CALCIUM SERPL-MCNC: 9.7 MG/DL (ref 8.5–10.1)
FERRITIN SERPL-MCNC: 205 NG/ML (ref 8–388)
FOLATE SERPL-MCNC: 4.5 NG/ML (ref 3.1–17.5)
IRON SATN MFR SERPL: 21 % (ref 20–50)
IRON SERPL-MCNC: 61 UG/DL (ref 50–175)
PTH-INTACT SERPL-MCNC: 58.2 PG/ML (ref 18.4–88)
T4 FREE SERPL-MCNC: 1.1 NG/DL (ref 0.7–1.5)
TIBC SERPL-MCNC: 285 UG/DL (ref 250–450)
TSH SERPL DL<=0.05 MIU/L-ACNC: 0.09 UIU/ML (ref 0.36–3.74)
URATE SERPL-MCNC: 11.1 MG/DL (ref 2.6–7.2)
VIT B12 SERPL-MCNC: 301 PG/ML (ref 211–911)

## 2025-03-28 PROCEDURE — 86431 RHEUMATOID FACTOR QUANT: CPT

## 2025-03-28 PROCEDURE — 84550 ASSAY OF BLOOD/URIC ACID: CPT

## 2025-03-28 PROCEDURE — 82728 ASSAY OF FERRITIN: CPT

## 2025-03-28 PROCEDURE — 84480 ASSAY TRIIODOTHYRONINE (T3): CPT

## 2025-03-28 PROCEDURE — 83970 ASSAY OF PARATHORMONE: CPT

## 2025-03-28 PROCEDURE — 83550 IRON BINDING TEST: CPT

## 2025-03-28 PROCEDURE — 84443 ASSAY THYROID STIM HORMONE: CPT

## 2025-03-28 PROCEDURE — 83540 ASSAY OF IRON: CPT

## 2025-03-28 PROCEDURE — 82607 VITAMIN B-12: CPT

## 2025-03-28 PROCEDURE — 82306 VITAMIN D 25 HYDROXY: CPT

## 2025-03-28 PROCEDURE — 84439 ASSAY OF FREE THYROXINE: CPT

## 2025-03-28 PROCEDURE — 36415 COLL VENOUS BLD VENIPUNCTURE: CPT

## 2025-03-28 PROCEDURE — 86200 CCP ANTIBODY: CPT

## 2025-03-28 PROCEDURE — 82746 ASSAY OF FOLIC ACID SERUM: CPT

## 2025-03-28 PROCEDURE — 86038 ANTINUCLEAR ANTIBODIES: CPT

## 2025-03-28 PROCEDURE — 83520 IMMUNOASSAY QUANT NOS NONAB: CPT

## 2025-03-28 RX ORDER — ALLOPURINOL 100 MG/1
100 TABLET ORAL DAILY
Qty: 90 TABLET | Refills: 1 | Status: CANCELLED | OUTPATIENT
Start: 2025-03-28

## 2025-03-28 RX ORDER — COLCHICINE 0.6 MG/1
CAPSULE ORAL
Qty: 90 CAPSULE | Refills: 1 | Status: CANCELLED | OUTPATIENT
Start: 2025-03-28

## 2025-03-28 RX ORDER — INDOMETHACIN 25 MG/1
25 CAPSULE ORAL 3 TIMES DAILY
Qty: 270 CAPSULE | Refills: 1 | Status: SHIPPED | OUTPATIENT
Start: 2025-03-28

## 2025-03-28 RX ORDER — PREDNISONE 10 MG/1
TABLET ORAL
Qty: 1 EACH | Refills: 0 | Status: CANCELLED | OUTPATIENT
Start: 2025-03-28

## 2025-03-28 RX ORDER — ALLOPURINOL 100 MG/1
100 TABLET ORAL DAILY
Qty: 90 TABLET | Refills: 1 | OUTPATIENT
Start: 2025-03-28

## 2025-03-28 ASSESSMENT — PATIENT HEALTH QUESTIONNAIRE - PHQ9
SUM OF ALL RESPONSES TO PHQ QUESTIONS 1-9: 0
SUM OF ALL RESPONSES TO PHQ QUESTIONS 1-9: 0
1. LITTLE INTEREST OR PLEASURE IN DOING THINGS: NOT AT ALL
SUM OF ALL RESPONSES TO PHQ QUESTIONS 1-9: 0
SUM OF ALL RESPONSES TO PHQ QUESTIONS 1-9: 0
2. FEELING DOWN, DEPRESSED OR HOPELESS: NOT AT ALL

## 2025-03-28 NOTE — PROGRESS NOTES
Levi Levy (:  1995) is a 29 y.o. male, here for evaluation of the following medical concerns:  Chief Complaint   Patient presents with    Annual Exam         ASSESSMENT/PLAN:    Assessment & Plan  1. Abscess on the left middle finger. Picture in chart  - The condition appears to be an abscess, potentially a paronychia, characterized by infection at the nail bed that has extended into the finger, causing significant swelling on one side.  - A regimen of Augmentin 875 mg twice daily for 7 days has been prescribed, with instructions to take the medication with food to mitigate potential gastrointestinal discomfort.  - The prescription will be sent to [facility]. She is advised to commence the treatment today if possible, followed by twice-daily administration from tomorrow onwards. Additionally, she is encouraged to perform Epsom salt soaks 2 to 5 times daily to facilitate fluid drainage.  - A tetanus vaccine will be administered during this visit. Should the condition deteriorate, a referral to a hand specialist will be considered.    2. Allergies.  - She exhibits signs of allergies, including bubbles in the ears and slight nasal swelling.  - Physical examination reveals inflammation in the eustachian tubes and nasal swelling.  - She is advised to consider taking allergy medications to manage symptoms.  - Counseling provided on the potential impact of environmental allergens and the importance of managing symptoms.    3. Irregular heartbeat.hx of chronic anemia, echo  noted Chiari network right atrium  2D ECHO 2023 noted EF 55%  Interpretation Summary  A 2D transesophageal echocardiogram with Doppler and color flow Doppler  was performed. 2023  The study was technically adequate.  No valve vegetation is seen.  A catheter present in the RA and SVC does not appear to have any  vegetation  A chiari network is present in the right atrium.  Normal LV systolic

## 2025-03-28 NOTE — PROGRESS NOTES
\"Have you been to the ER, urgent care clinic since your last visit?  Hospitalized since your last visit?\"    NO    “Have you seen or consulted any other health care providers outside our system since your last visit?”    Yes- Emily Levy denies any symptoms, reactions or allergies that would exclude them from being immunized today. TDAP/BOOSTRIX 0.5mL was administered in left deltoid per order. Risks and adverse reactions were discussed , VIS was given to them. All questions were addressed.

## 2025-04-01 ENCOUNTER — RESULTS FOLLOW-UP (OUTPATIENT)
Facility: CLINIC | Age: 30
End: 2025-04-01

## 2025-04-01 DIAGNOSIS — E55.9 VITAMIN D DEFICIENCY: ICD-10-CM

## 2025-04-01 DIAGNOSIS — E05.90 SUBCLINICAL HYPERTHYROIDISM: Primary | ICD-10-CM

## 2025-04-01 LAB — T3 SERPL-MCNC: 72 NG/DL (ref 71–180)

## 2025-04-01 RX ORDER — ERGOCALCIFEROL 1.25 MG/1
50000 CAPSULE, LIQUID FILLED ORAL WEEKLY
Qty: 12 CAPSULE | Refills: 1 | Status: SHIPPED | OUTPATIENT
Start: 2025-04-01

## 2025-04-02 LAB
ANA TITR SER IF: NEGATIVE
LABORATORY COMMENT REPORT: NORMAL

## 2025-04-10 LAB
14-3-3 ETA AG SER IA-MCNC: <0.2 NG/ML
CCP IGA+IGG SERPL IA-ACNC: <20 UNITS
RHEUMATOID FACT SERPL-ACNC: <14 UNITS/ML

## 2025-04-25 ENCOUNTER — TELEMEDICINE (OUTPATIENT)
Facility: CLINIC | Age: 30
End: 2025-04-25
Payer: COMMERCIAL

## 2025-04-25 DIAGNOSIS — M10.9 EXACERBATION OF GOUT: ICD-10-CM

## 2025-04-25 DIAGNOSIS — E55.9 VITAMIN D DEFICIENCY: ICD-10-CM

## 2025-04-25 PROCEDURE — 99213 OFFICE O/P EST LOW 20 MIN: CPT | Performed by: NURSE PRACTITIONER

## 2025-04-25 RX ORDER — INDOMETHACIN 25 MG/1
25 CAPSULE ORAL 3 TIMES DAILY
Qty: 270 CAPSULE | Refills: 1 | Status: CANCELLED | OUTPATIENT
Start: 2025-04-25

## 2025-04-25 RX ORDER — ALLOPURINOL 100 MG/1
100 TABLET ORAL DAILY
Qty: 90 TABLET | Refills: 1 | Status: CANCELLED | OUTPATIENT
Start: 2025-04-25

## 2025-04-25 RX ORDER — COLCHICINE 0.6 MG/1
CAPSULE ORAL
Qty: 90 CAPSULE | Refills: 1 | Status: SHIPPED | OUTPATIENT
Start: 2025-04-25

## 2025-04-25 RX ORDER — PREDNISONE 10 MG/1
TABLET ORAL
Qty: 1 EACH | Refills: 0 | Status: SHIPPED | OUTPATIENT
Start: 2025-04-25

## 2025-04-25 RX ORDER — ERGOCALCIFEROL 1.25 MG/1
50000 CAPSULE, LIQUID FILLED ORAL WEEKLY
Qty: 12 CAPSULE | Refills: 1 | Status: CANCELLED | OUTPATIENT
Start: 2025-04-25

## 2025-04-25 ASSESSMENT — PATIENT HEALTH QUESTIONNAIRE - PHQ9
SUM OF ALL RESPONSES TO PHQ QUESTIONS 1-9: 0
1. LITTLE INTEREST OR PLEASURE IN DOING THINGS: NOT AT ALL
SUM OF ALL RESPONSES TO PHQ QUESTIONS 1-9: 0
2. FEELING DOWN, DEPRESSED OR HOPELESS: NOT AT ALL

## 2025-04-25 NOTE — PROGRESS NOTES
Levi Levy, was evaluated through a synchronous (real-time) audio-video encounter. The patient (or guardian if applicable) is aware that this is a billable service, which includes applicable co-pays. This Virtual Visit was conducted with patient's (and/or legal guardian's) consent. Patient identification was verified, and a caregiver was present when appropriate.   The patient was located at Home: [address]  [facility] [address]  Provider was located at Facility (Appt Dept): [address]  [place] [address]  Confirm you are appropriately licensed, registered, or certified to deliver care in the state where the patient is located as indicated above. If you are not or unsure, please re-schedule the visit: Yes, I confirm.     Levi Levy (:  1995) is a Established patient, presenting virtually for evaluation of the following:      Below is the assessment and plan developed based on review of pertinent history, physical exam, labs, studies, and medications.     Assessment & Plan  Vitamin D deficiency            Exacerbation of gout       Orders:    predniSONE 10 MG (21) TBPK; Take as directed    colchicine (MITIGARE) 0.6 MG capsule; Take 2 tablets by mouth once, then take 1 tablet by mouth one hour from first dose once. Then take 1 tablet daily until flare up resolves.  other medications are too soon to fill    No follow-ups on file.       Subjective   Gout  Associated symptoms include arthralgias.     Entire right side of the body shoulder, elbows, hands, knees, hands. Feels like this is another gout flare.   Missed appt with rheumatology due to family emergency  Lab Results   Component Value Date     (L) 2024    K 4.9 2024     2024    CO2 23 2024    BUN 23 (H) 2024    CREATININE 1.51 (H) 2024    GLUCOSE 91 2024    CALCIUM 9.7 2025    BILITOT 0.6 2024    ALKPHOS 115 2024    AST 16 2024    ALT 22

## 2025-04-29 ENCOUNTER — TELEPHONE (OUTPATIENT)
Facility: CLINIC | Age: 30
End: 2025-04-29

## 2025-04-29 NOTE — TELEPHONE ENCOUNTER
Called patient in regards to today's appt note to inquire about symptoms to be seen in office, patient advised he isn't able to make it into the office due to transportation issues. Since yesterday he has been experiencing blood in his urine, feeling dehydrated, felt like he was going to pass out, dry mouth, just feeling bad overall. Notified NP Valdez where she advised he call 911 to be taken to ER/hospital to be seen. Patient was advised and stated he would contact 911.

## 2025-05-01 PROBLEM — N17.9 AKI (ACUTE KIDNEY INJURY): Status: ACTIVE | Noted: 2025-05-01

## 2025-05-14 ENCOUNTER — TELEMEDICINE (OUTPATIENT)
Facility: CLINIC | Age: 30
End: 2025-05-14

## 2025-05-14 DIAGNOSIS — Z91.199 NO-SHOW FOR APPOINTMENT: Primary | ICD-10-CM

## 2025-05-14 NOTE — PROGRESS NOTES
Levi Levy, was evaluated through a synchronous (real-time) audio-video encounter. The patient (or guardian if applicable) is aware that this is a billable service, which includes applicable co-pays. This Virtual Visit was conducted with patient's (and/or legal guardian's) consent. Patient identification was verified, and a caregiver was present when appropriate.   The patient was located at   Provider was located at   Confirm you are appropriately licensed, registered, or certified to deliver care in the state where the patient is located as indicated above. If you are not or unsure, please re-schedule the visit:     Levi Levy (:  1995) is a , presenting virtually for evaluation of the following:      Below is the assessment and plan developed based on review of pertinent history, physical exam, labs, studies, and medications.     Assessment & Plan      No follow-ups on file.       Subjective   HPI  Review of Systems       Objective   Patient-Reported Vitals  No data recorded     Physical Exam             --BRITT El NP

## 2025-08-25 ENCOUNTER — TELEMEDICINE (OUTPATIENT)
Facility: CLINIC | Age: 30
End: 2025-08-25
Payer: COMMERCIAL

## 2025-08-25 DIAGNOSIS — M10.9 EXACERBATION OF GOUT: ICD-10-CM

## 2025-08-25 PROCEDURE — 99213 OFFICE O/P EST LOW 20 MIN: CPT | Performed by: NURSE PRACTITIONER

## 2025-08-25 RX ORDER — ALLOPURINOL 100 MG/1
100 TABLET ORAL DAILY
Qty: 90 TABLET | Refills: 1 | Status: SHIPPED | OUTPATIENT
Start: 2025-08-25

## 2025-08-25 RX ORDER — INDOMETHACIN 25 MG/1
25 CAPSULE ORAL 3 TIMES DAILY
Qty: 270 CAPSULE | Refills: 1 | Status: SHIPPED | OUTPATIENT
Start: 2025-08-25

## 2025-08-25 ASSESSMENT — PATIENT HEALTH QUESTIONNAIRE - PHQ9
SUM OF ALL RESPONSES TO PHQ QUESTIONS 1-9: 0
SUM OF ALL RESPONSES TO PHQ QUESTIONS 1-9: 0
2. FEELING DOWN, DEPRESSED OR HOPELESS: NOT AT ALL
SUM OF ALL RESPONSES TO PHQ QUESTIONS 1-9: 0
SUM OF ALL RESPONSES TO PHQ QUESTIONS 1-9: 0
1. LITTLE INTEREST OR PLEASURE IN DOING THINGS: NOT AT ALL